# Patient Record
Sex: MALE | Employment: FULL TIME | URBAN - NONMETROPOLITAN AREA
[De-identification: names, ages, dates, MRNs, and addresses within clinical notes are randomized per-mention and may not be internally consistent; named-entity substitution may affect disease eponyms.]

---

## 2023-08-30 ENCOUNTER — OCCUPATIONAL MEDICINE (OUTPATIENT)
Dept: URGENT CARE | Facility: PHYSICIAN GROUP | Age: 38
End: 2023-08-30
Payer: OTHER MISCELLANEOUS

## 2023-08-30 VITALS
BODY MASS INDEX: 25.92 KG/M2 | DIASTOLIC BLOOD PRESSURE: 80 MMHG | SYSTOLIC BLOOD PRESSURE: 128 MMHG | WEIGHT: 175 LBS | OXYGEN SATURATION: 98 % | TEMPERATURE: 97.1 F | RESPIRATION RATE: 14 BRPM | HEART RATE: 67 BPM | HEIGHT: 69 IN

## 2023-08-30 DIAGNOSIS — M25.572 ACUTE LEFT ANKLE PAIN: ICD-10-CM

## 2023-08-30 DIAGNOSIS — Z02.6 ENCOUNTER RELATED TO WORKER'S COMPENSATION CLAIM: ICD-10-CM

## 2023-08-30 PROCEDURE — 3079F DIAST BP 80-89 MM HG: CPT | Performed by: NURSE PRACTITIONER

## 2023-08-30 PROCEDURE — 99203 OFFICE O/P NEW LOW 30 MIN: CPT | Performed by: NURSE PRACTITIONER

## 2023-08-30 PROCEDURE — 3074F SYST BP LT 130 MM HG: CPT | Performed by: NURSE PRACTITIONER

## 2023-08-30 RX ORDER — GABAPENTIN 400 MG/1
400 CAPSULE ORAL 3 TIMES DAILY
COMMUNITY

## 2023-08-30 RX ORDER — CLONAZEPAM 1 MG/1
TABLET ORAL 2 TIMES DAILY
COMMUNITY

## 2023-08-30 NOTE — PROGRESS NOTES
"Subjective:     Chuck Castro is a 38 y.o. male who presents for Ankle Injury (WC inj 8/29/2023 L ankle, pt was walking took a step down and twisted ankle, swelling, redness,able to put some weight on it )      DOI: 08/29/2023 ALYSSA: STEPPED WRONG OFF THE STEP AND TWISTED ANKLE  Visit #1-patient presents to clinic today acute left ankle pain after reporting that he stepped off of a door stoop and twisted his left ankle.  He states he was initially able to walk on it however throughout the day progressively got more swollen, tender, and painful with ambulation.  Patient woke up this morning and noticed bruising on lateral side of ankle.  He states today that he is able to bear weight however does ambulate with a limp.  He denies any numbness or tingling in his foot or ankle.  Patient did ambulate it and placed ice on it last night which did help.  He also took 400 mg gabapentin which also helped with pain.  He states that his pain level today is a 3-4/10, consistently a dull ache with intermittent sharp shooting pains that are worse with weightbearing or certain movements.  Denies any previous surgeries or injuries to his left ankle.  Denies second job.      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 Slightly antalgic gait.  Ankles: No noticeable deformity. Positive for swelling and bruising lateral ankle. ROM intact, does elicit pain with flexion and rotation. Tenderness to palpation on lateral ankle. No tenderness to palpation along posterior edge of lateral and medial malleoli, base of 5th metatarsal or navicular bone. " No tenderness along fibula. 5/5 strength bilaterally. Sensation intact. 2+ pedal pulses. Ankle reflex 2+.       Assessment/Plan:       1. Encounter related to worker's compensation claim    2. Acute left ankle pain  - DX-ANKLE 3+ VIEWS LEFT  - Walking Boot    Other orders  - gabapentin (NEURONTIN) 400 MG Cap; Take 400 mg by mouth 3 times a day.  - clonazePAM (KLONOPIN) 1 MG Tab; Take  by mouth 2 times a day.    Released to Restricted Duty FROM 8/30/2023 TO 9/6/2023  Work restrictions include no stooping, squatting, pushing, pulling, or climbing.  No walking or standing greater than 2 hours during shift.  Weight limit no more than 25 pounds.  Must wear walking boot while at work and when ambulating off duty.  May remove at night.  Recommended cryotherapy 20 minutes 2-3 times daily.  May take Tylenol 500 to 1000 mg up to 3 times daily and ibuprofen 600 mg up to 3 times daily with food.  Educated patient on gentle range of motion and stretching exercises.  X-ray order placed.       Differential diagnosis, natural history, supportive care, and indications for immediate follow-up discussed.    Approximately 35 minutes were spent in reviewing notes, preparing for visit, obtaining history, exam and evaluation, patient counseling/education and post visit documentation/orders.

## 2023-08-30 NOTE — LETTER
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    Medical Information   Related to Industrial Injury? Yes    Subjective Complaints:  DOI: 08/29/2023 ALYSSA: STEPPED WRONG OFF THE STEP AND TWISTED ANKLE  Visit #1-patient presents to clinic today acute left ankle pain after reporting that he stepped off of a door stoop and twisted his left ankle.  He states he was initially able to walk on it however throughout the day progressively got more swollen, tender, and painful with ambulation.  Patient woke up this morning and noticed bruising on lateral side of ankle.  He states today that he is able to bear weight however does ambulate with a limp.  He denies any numbness or tingling in his foot or ankle.  Patient did ambulate it and placed ice on it last night which did help.  He also took 400 mg gabapentin which also helped with pain.  He states that his pain level today is a 3-4/10, consistently a dull ache with intermittent sharp shooting pains that are worse with weightbearing or certain movements.  Denies any previous surgeries or injuries to his left ankle.  Denies second job.   Objective Findings: MSK: Patient is able to bear weight on Left foot. Slightly antalgic gait.  Ankles: No noticeable deformity. Positive for swelling and bruising lateral ankle. ROM intact, does elicit pain with flexion and rotation. Tenderness to palpation on lateral ankle. No tenderness to palpation  along posterior edge of lateral and medial malleoli, base of 5th metatarsal or navicular bone. No tenderness along fibula. 5/5 strength bilaterally. Sensation intact. 2+ pedal pulses. Ankle reflex 2+.      Pre-Existing Condition(s):     Assessment:   Initial Visit    Status: Additional Care Required  Permanent Disability:No    Plan: Medication    Diagnostics: X-ray    Comments:       Disability Information   Status: Released to Restricted Duty    From:  2023  Through: 2023 Restrictions are: Temporary   Physical Restrictions   Sitting:    Standing:  < or = to 2 hrs/day Stoopin hrs/day Bending:      Squattin hrs/day Walking:  < or = to 2 hrs/day Climbin hrs/day Pushin hrs/day   Pullin hrs/day Other:    Reaching Above Shoulder (L):   Reaching Above Shoulder (R):       Reaching Below Shoulder (L):    Reaching Below Shoulder (R):      Not to exceed Weight Limits   Carrying(hrs):   Weight Limit(lb): < or = to 25 pounds Lifting(hrs):   Weight  Limit(lb): < or = to 25 pounds   Comments: Work restrictions include no stooping, squatting, pushing, pulling, or climbing.  No walking or standing greater than 2 hours during shift.  Weight limit no more than 25 pounds.  Must wear walking boot while at work and when ambulating off duty.  May remove at night.  Recommended cryotherapy 20 minutes 2-3 times daily.  May take Tylenol 500 to 1000 mg up to 3 times daily and ibuprofen 600 mg up to 3 times daily with food.  Educated patient on gentle range of motion and stretching exercises.  X-ray order placed.    Repetitive Actions   Hands: i.e. Fine Manipulations from Grasping:     Feet: i.e. Operating Foot Controls:     Driving / Operate Machinery:     Health Care Provider’s Original or Electronic Signature  JAHAIRA Chung Health Care Provider’s Original or Electronic Signature    Zaid Lopez DO MPH     Clinic Name / Location: [address]  [address] Clinic Phone Number: Dept: [phone]   Appointment Time: 10:45 Am Visit Start Time: 10:59 AM   Check-In Time:  9:51 Am Visit Discharge Time:  11:49 AM    Original-Treating Physician or Chiropractor    Page 2-Insurer/TPA    Page 3-Employer    Page 4-Employee

## 2023-08-30 NOTE — LETTER
"EMPLOYEE’S CLAIM FOR COMPENSATION/ REPORT OF INITIAL TREATMENT  FORM C-4  PLEASE TYPE OR PRINT    EMPLOYEE’S CLAIM - PROVIDE ALL INFORMATION REQUESTED   First Name  Chuck Last Name  Castro Birthdate                    1985                Sex  male Claim Number (Insurer’s Use Only)   Home Address  [address] Age  38 y.o. 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 (Be specific and answer in detail. Use additional sheet if necessary)  STEPPED WRONG OFF THE STEP AND TWISTED ANKLE   If you believe that you have an occupational disease, when did you first have knowledge of the disability and its relationship to your employment?  NA Witnesses to the Accident (if applicable)  NA      Nature of Injury or Occupational Disease  Workers' Compensation  Part(s) of Body Injured or Affected  Ankle (L), ,     I CERTIFY THAT THE ABOVE IS TRUE AND CORRECT TO T HE BEST OF MY KNOWLEDGE AND THAT I HAVE PROVIDED THIS INFORMATION IN ORDER TO OBTAIN THE BENEFITS OF [place], OR [place]).  I HEREBY AUTHORIZE ANY PHYSICIAN, CHIROPRACTOR, SURGEON, PRACTITIONER OR ANY OTHER PERSON, ANY HOSPITAL, INCLUDING [facility] OR [facility], ANY  MEDICAL SERVICE ORGANIZATION, ANY INSURANCE COMPANY, OR OTHER INSTITUTION OR ORGANIZATION TO RELEASE TO EACH OTHER, ANY MEDICAL OR OTHER INFORMATION, INCLUDING BENEFITS PAID OR PAYABLE, PERTINENT TO THIS INJURY OR DISEASE, EXCEPT INFORMATION RELATIVE TO DIAGNOSIS, TREATMENT AND/OR COUNSELING FOR AIDS, PSYCHOLOGICAL CONDITIONS, ALCOHOL OR CONTROLLED SUBSTANCES, FOR WHICH I MUST GIVE SPECIFIC AUTHORIZATION.  A PHOTOSTAT OF THIS AUTHORIZATION SHALL BE VALID AS THE ORIGINAL.       Date  08/30/2023   [facility] Employee’s Original or  *Electronic Signature   THIS REPORT MUST BE COMPLETED AND MAILED WITHIN 3 WORKING DAYS OF TREATMENT   [facility]  Name of Facility  [place]   Date  8/30/2023 Diagnosis and Description of Injury or Occupational Disease  (Z02.6) Encounter related to worker's compensation claim  (M25.572) Acute left ankle pain Is there evidence that the injured employee was under the influence of alcohol and/or another controlled substance at the time of accident?  ? No ? Yes (if yes, please explain)   Hour  10:59 AM   Diagnoses of Encounter related to worker's compensation claim and Acute left ankle pain were pertinent to this visit. No   Treatment  Work restrictions include no stooping, squatting, pushing, pulling, or climbing.  No walking or standing greater than 2 hours during shift.  Weight limit no more than 25 pounds.  Must wear walking boot while at work and when ambulating off duty.  May remove at night.  Recommended cryotherapy 20 minutes 2-3 times daily.  May take Tylenol 500 to 1000 mg up to 3 times daily and ibuprofen 600 mg up to 3 times daily with food.  Educated patient on gentle range of motion and  stretching exercises.  X-ray order placed.  Have you advised the patient to remain off work five days or     more?    X-Ray Findings    Comments:Pending   ? Yes Indicate dates:   From   To      From information given by the employee, together with medical evidence, can        you directly connect this injury or occupational disease as job incurred?  Yes ? No If no, is the injured employee capable of:  ? full duty  No ? modified duty  Yes   Is additional medical care by a physician indicated?  Yes If modified duty, specify any limitations / restrictions  Work restrictions include no stooping, squatting, pushing, pulling, or climbing.  No walking or standing greater than 2 hours during shift.  Weight limit no more than 25 pounds.   Do you know of any previous injury or disease contributing to this condition or occupational disease?  ? Yes ? No (Explain if yes)                          No   Date  8/30/2023 Print Health Care Provider's  Name  JAHAIRA Chung I certify that the employer’s copy of  this form was delivered to the employer on:   Address  [address] [place] [address] Insurer’s Use Only     [facility]  [address]    Provider’s Tax ID Number  [id number] Telephone  Dept: [phone]             Health Care Provider’s Original or Electronic Signature  e-SignSOLGA HARDING Degree (MD,DO, DC,PA-C,APRN)  APRN      * Complete and attach Release of Information (Form C-4A) when injured employee signs C-4 Form electronically  ORIGINAL - TREATING HEALTHCARE PROVIDER PAGE 2 - INSURER/TPA PAGE 3 - EMPLOYER PAGE 4 - EMPLOYEE             Form C-4 (rev.08/21)           BRIEF DESCRIPTION OF RIGHTS AND BENEFITS  (Pursuant to [place] 616C.050)    Notice of Injury or Occupational Disease (Incident Report Form C-1): If an injury or occupational disease (OD) arises out of and in the course of employment, you must provide written notice to your employer as soon as practicable, but no later than 7 days  "after the accident or OD. Your employer shall maintain a sufficient supply of the required forms.    Claim for Compensation (Form C-4): If medical treatment is sought, the form C-4 is available at the place of initial treatment. A completed \"Claim for Compensation\" (Form C-4) must be filed within 90 days after an accident or OD. The treating physician or chiropractor must, within 3 working days after treatment, complete and mail to the employer, the employer's insurer and third-party , the Claim for Compensation.    Medical Treatment: If you require medical treatment for your on-the-job injury or OD, you may be required to select a physician or chiropractor from a list provided by your workers’ compensation insurer, if it has contracted with an Organization for Managed Care (MCO) or Preferred Provider Organization (PPO) or providers of health care. If your employer has not entered into a contract with an MCO or PPO, you may select a physician or chiropractor from the Panel of Physicians and Chiropractors. Any medical costs related to your industrial injury or OD will be paid by your insurer.    Temporary Total Disability (TTD): If your doctor has certified that you are unable to work for a period of at least 5 consecutive days, or 5 cumulative days in a 20-day period, or places restrictions on you that your employer does not accommodate, you may be entitled to TTD compensation.    Temporary Partial Disability (TPD): If the wage you receive upon reemployment is less than the compensation for TTD to which you are entitled, the insurer may be required to pay you TPD compensation to make up the difference. TPD can only be paid for a maximum of 24 months.    Permanent Partial Disability (PPD): When your medical condition is stable and there is an indication of a PPD as a result of your injury or OD, within 30 days, your insurer must arrange for an evaluation by a rating physician or chiropractor to determine " the degree of your PPD. The amount of your PPD award depends on the date of injury, the results of the PPD evaluation, your age and wage.    Permanent Total Disability (PTD): If you are medically certified by a treating physician or chiropractor as permanently and totally disabled and have been granted a PTD status by your insurer, you are entitled to receive monthly benefits not to exceed 66 2/3% of your average monthly wage. The amount of your PTD payments is subject to reduction if you previously received a lump-sum PPD award.    Vocational Rehabilitation Services: You may be eligible for vocational rehabilitation services if you are unable to return to the job due to a permanent physical impairment or permanent restrictions as a result of your injury or occupational disease.    Transportation and Per Juan Reimbursement: You may be eligible for travel expenses and per juan associated with medical treatment.    Reopening: You may be able to reopen your claim if your condition worsens after claim closure.     Appeal Process: If you disagree with a written determination issued by the insurer or the insurer does not respond to your request, you may appeal to the Department of Administration, , by following the instructions contained in your determination letter. You must appeal the determination within 70 days from the date of the determination letter at [address], [address], [place], [place] [address], or [address] [facility] [address], [place] [address]. If you disagree with the  decision, you may appeal to the Department of Administration, . You must file your appeal within 30 days from the date of the  decision letter at [address], [address], [place], [place] [address], or [address] [facility] [facility] [address], [place] [address]. If you disagree with a decision of an , you may file a petition for judicial  review with the District Court. You must do so within 30 days of the Appeal Officer’s decision. You may be represented by an  at your own expense or you may contact the [facility] for possible representation.    [place]  for Injured Workers (NAIW): If you disagree with a  decision, you may request that NAIW represent you without charge at an  Hearing. For information regarding denial of benefits, you may contact the [facility] at: [address], [address], [place] [address], [phone], or [address], [address] [address], [phone]    To File a Complaint with the Division: If you wish to file a complaint with the  of the Division of Industrial Relations (DIR),  please contact the Workers’ Compensation Section, [address], [address], [place], [place] [address], telephone [phone], or [address], [address], [place], [place] [address], telephone [phone].    For assistance with Workers’ Compensation Issues: You may contact the [facility] Office for Consumer Health Assistance, [address], [address], [place], [place] [address], Toll Free 1-714.743.4312, Web site: http://[facility].[place].gov/Programs/INDIO E-mail: indio@[facility].nv.gov              08/30/2023            __________________________________________________________________                                    _________________            Employee Name / Signature                                                                                                                            Date                                                                                                                                                                                                                              D-2 (rev. 10/20)

## 2023-09-06 ENCOUNTER — OCCUPATIONAL MEDICINE (OUTPATIENT)
Dept: URGENT CARE | Facility: PHYSICIAN GROUP | Age: 38
End: 2023-09-06
Payer: OTHER MISCELLANEOUS

## 2023-09-06 VITALS
DIASTOLIC BLOOD PRESSURE: 78 MMHG | HEART RATE: 69 BPM | RESPIRATION RATE: 16 BRPM | BODY MASS INDEX: 25.92 KG/M2 | OXYGEN SATURATION: 99 % | WEIGHT: 175 LBS | HEIGHT: 69 IN | SYSTOLIC BLOOD PRESSURE: 120 MMHG | TEMPERATURE: 97.5 F

## 2023-09-06 DIAGNOSIS — S93.402D SPRAIN OF LEFT ANKLE, UNSPECIFIED LIGAMENT, SUBSEQUENT ENCOUNTER: ICD-10-CM

## 2023-09-06 DIAGNOSIS — Z02.6 ENCOUNTER RELATED TO WORKER'S COMPENSATION CLAIM: ICD-10-CM

## 2023-09-06 PROCEDURE — 3074F SYST BP LT 130 MM HG: CPT | Performed by: NURSE PRACTITIONER

## 2023-09-06 PROCEDURE — 99213 OFFICE O/P EST LOW 20 MIN: CPT | Performed by: NURSE PRACTITIONER

## 2023-09-06 PROCEDURE — 3078F DIAST BP <80 MM HG: CPT | Performed by: NURSE PRACTITIONER

## 2023-09-06 NOTE — PROGRESS NOTES
Subjective:     Chuck Castro is a 38 y.o. male who presents for Work-Related Injury (Left foot- , but getting slightly better)      Copied from last visit  DOI: 08/29/2023 ALYSSA: STEPPED WRONG OFF THE STEP AND TWISTED ANKLE  Visit #1-patient presents to clinic today acute left ankle pain after reporting that he stepped off of a door stoop and twisted his left ankle.  He states he was initially able to walk on it however throughout the day progressively got more swollen, tender, and painful with ambulation.  Patient woke up this morning and noticed bruising on lateral side of ankle.  He states today that he is able to bear weight however does ambulate with a limp.  He denies any numbness or tingling in his foot or ankle.  Patient did ambulate it and placed ice on it last night which did help.  He also took 400 mg gabapentin which also helped with pain.  He states that his pain level today is a 3-4/10, consistently a dull ache with intermittent sharp shooting pains that are worse with weightbearing or certain movements.  Denies any previous surgeries or injuries to his left ankle.  Denies second job.    HPI 09/06/2023  FV #1-patient is here for follow-up visit due to left ankle sprain.  He states that he has seen improvement in both pain and swelling since his last visit.  He does continue to express pain in his ankle feeling tightness and band like.  He states pain is mildly worsened with walking, flexion and extension of his ankle, and lateral movements.  Mild pain in his heel and Achilles area. He does find comfort with his walking boot, Tylenol, ibuprofen, and cryotherapy.   Has been able to work with current restricted duty requirements.    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 Slightly antalgic gait.  Walking boot present.  Ankles: No noticeable deformity. Positive for mild swelling lateral ankle.  Negative for bruising.  ROM intact, does elicit pain with flexion and rotation. Tenderness to palpation on lateral ankle. No tenderness to palpation along posterior edge of lateral and medial malleoli, base of 5th metatarsal or navicular bone. No tenderness along fibula. 5/5 strength bilaterally. Sensation intact. 2+ pedal pulses.     Assessment/Plan:       1. Encounter related to worker's compensation claim    2. Sprain of left ankle, unspecified ligament, subsequent encounter  Patient continues have improvement to left ankle sprain.  Continue on current work restrictions and plan of care as listed below.    Released to Restricted Duty FROM 9/6/2023 TO 9/20/2023  Continue on current work restrictions which include no stooping, squatting, pushing, pulling, or climbing.  No walking or standing greater than 2 hours during shift.  Weight limit no more than 25 pounds.  Must wear walking boot while at work and when ambulating off duty.  May remove at night.  Recommended cryotherapy 20 minutes 2-3 times daily.  Elevate 2-3 times daily.  May take Tylenol 500 to 1000 mg up to 3 times daily and ibuprofen 600 mg up to 3 times daily with food.  Educated patient on gentle range of motion and stretching exercises.  Still waiting on obtaining ankle x-rays from [facility].  Call made to medical records by MA today.  Will call patient if any abnormalities are noted.       Differential diagnosis, natural history, supportive care, and indications for immediate follow-up discussed.    "

## 2023-09-06 NOTE — LETTER
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    Medical Information   Related to Industrial Injury? Yes    Subjective Complaints:  Copied from last visit  DOI: 08/29/2023 ALYSSA: STEPPED WRONG OFF THE STEP AND TWISTED ANKLE  Visit #1-patient presents to clinic today acute left ankle pain after reporting that he stepped off of a door stoop and twisted his left ankle.  He states he was initially able to walk on it however throughout the day progressively got more swollen, tender, and painful with ambulation.  Patient woke up this morning and noticed bruising on lateral side of ankle.  He states today that he is able to bear weight however does ambulate with a limp.  He denies any numbness or tingling in his foot or ankle.  Patient did ambulate it and placed ice on it last night which did help.  He also took 400 mg gabapentin which also helped with pain.  He states that his pain level today is a 3-4/10, consistently a dull ache with intermittent sharp shooting pains that are worse with weightbearing or certain movements.  Denies any previous surgeries or injuries to his left ankle.  Denies second job.    HPI 09/06/2023  FV #1-patient is here for follow-up visit due to left ankle sprain.  He states that he has seen improvement in both pain and swelling since his last visit.  He does continue to express pain in his ankle feeling tightness  and band like.  He states pain is mildly worsened with walking, flexion and extension of his ankle, and lateral movements.  Mild pain in his heel and Achilles area. He does find comfort with his walking boot, Tylenol, ibuprofen, and cryotherapy.   Has been able to work with current restricted duty requirements.   Objective Findings: MSK: Patient is able to bear weight on Left foot. Slightly antalgic gait.  Walking boot present.  Ankles: No noticeable deformity. Positive for mild swelling lateral ankle.  Negative for bruising.  ROM intact, does elicit pain with flexion and rotation. Tenderness to palpation on lateral ankle. No tenderness to palpation along posterior edge of lateral and medial malleoli, base of 5th metatarsal or navicular bone. No tenderness along fibula. 5/5 strength bilaterally. Sensation intact. 2+ pedal pulses.    Pre-Existing Condition(s):     Assessment:   Condition Improved    Status: Additional Care Required  Permanent Disability:     Plan: Medication    Diagnostics:      Comments:       Disability Information   Status: Released to Restricted Duty    From:  9/6/2023  Through: 9/20/2023 Restrictions are: Temporary   Physical Restrictions   Sitting:    Standing:    Stooping:    Bending:      Squatting:    Walking:    Climbing:    Pushing:      Pulling:    Other:    Reaching Above Shoulder (L):   Reaching Above Shoulder (R):       Reaching Below Shoulder (L):    Reaching Below Shoulder (R):      Not to exceed Weight Limits   Carrying(hrs):   Weight Limit(lb):   Lifting(hrs):   Weight  Limit(lb):     Comments: Continue on current work restrictions which include no stooping, squatting, pushing, pulling, or climbing.  No walking or standing greater than 2 hours during shift.  Weight limit no more than 25 pounds.  Must wear walking boot while at work and when ambulating off duty.  May remove at night.  Recommended cryotherapy 20 minutes 2-3 times daily.  Elevate 2-3 times daily.  May take Tylenol 500  to 1000 mg up to 3 times daily and ibuprofen 600 mg up to 3 times daily with food.  Educated patient on gentle range of motion and stretching exercises.  Still waiting on obtaining ankle x-rays from [facility].  Call made to medical records by MA today.  Will call patient if any abnormalities are noted.    Repetitive Actions   Hands: i.e. Fine Manipulations from Grasping:     Feet: i.e. Operating Foot Controls:     Driving / Operate Machinery:     Health Care Provider’s Original or Electronic Signature  HÉCTOR ChungRCassieN. Health Care Provider’s Original or Electronic Signature    Zaid Lopez DO MPH     Clinic Name / Location: [address] [address] Clinic Phone Number: Dept: [phone]   Appointment Time: 10:00 Am Visit Start Time: 9:52 AM   Check-In Time:  9:38 Am Visit Discharge Time:  10:45 AM    Original-Treating Physician or Chiropractor    Page 2-Insurer/TPA    Page 3-Employer    Page 4-Employee

## 2023-09-20 ENCOUNTER — OCCUPATIONAL MEDICINE (OUTPATIENT)
Dept: URGENT CARE | Facility: PHYSICIAN GROUP | Age: 38
End: 2023-09-20
Payer: OTHER MISCELLANEOUS

## 2023-09-20 VITALS
TEMPERATURE: 98.1 F | SYSTOLIC BLOOD PRESSURE: 132 MMHG | RESPIRATION RATE: 16 BRPM | DIASTOLIC BLOOD PRESSURE: 82 MMHG | BODY MASS INDEX: 25.92 KG/M2 | HEIGHT: 69 IN | OXYGEN SATURATION: 98 % | HEART RATE: 78 BPM | WEIGHT: 175 LBS

## 2023-09-20 DIAGNOSIS — S93.402D SPRAIN OF LEFT ANKLE, UNSPECIFIED LIGAMENT, SUBSEQUENT ENCOUNTER: ICD-10-CM

## 2023-09-20 PROCEDURE — 3079F DIAST BP 80-89 MM HG: CPT | Performed by: PHYSICIAN ASSISTANT

## 2023-09-20 PROCEDURE — 99213 OFFICE O/P EST LOW 20 MIN: CPT | Performed by: PHYSICIAN ASSISTANT

## 2023-09-20 PROCEDURE — 3075F SYST BP GE 130 - 139MM HG: CPT | Performed by: PHYSICIAN ASSISTANT

## 2023-09-20 NOTE — LETTER
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    Medical Information   Related to Industrial Injury?   yes   Subjective Complaints:    The patient presents to clinic for Workmen's Comp. follow-up.    DOI: 08/29/2023  ALYSSA: STEPPED WRONG OFF THE STEP AND TWISTED ANKLE    Previous Visits:  Visit #1-patient presents to clinic today acute left ankle pain after reporting that he stepped off of a door stoop and twisted his left ankle.  He states he was initially able to walk on it however throughout the day progressively got more swollen, tender, and painful with ambulation.  Patient woke up this morning and noticed bruising on lateral side of ankle.  He states today that he is able to bear weight however does ambulate with a limp.  He denies any numbness or tingling in his foot or ankle.  Patient did ambulate it and placed ice on it last night which did help.  He also took 400 mg gabapentin which also helped with pain.  He states that his pain level today is a 3-4/10, consistently a dull ache with intermittent sharp shooting pains that are worse with weightbearing or certain movements.  Denies any previous surgeries or injuries to his left ankle.  Denies second job.    FV #1-patient is here for follow-up visit due to left ankle sprain.  He states that he has seen improvement in both pain and swelling since his last visit.  He does continue to express pain in his ankle feeling tightness and band like.  He  states pain is mildly worsened with walking, flexion and extension of his ankle, and lateral movements.  Mild pain in his heel and Achilles area. He does find comfort with his walking boot, Tylenol, ibuprofen, and cryotherapy.   Has been able to work with current restricted duty requirements.    Today:  FV #2:  Today, the patient reports improvement of his left ankle sprain.  The patient states he is approximately 65% improved at today's visit.  The patient reports continued pain and soreness to the lateral aspect of his left ankle.  The patient states he is also experiencing some stiffness of the Achilles tendon.  The patient notes intermittent swelling of the left ankle.  He reports no associated bruising or redness.  The patient states he has been wearing his walking boot as directed, but states he is able to ambulate at home without the walking boot.  The patient notes some increased pain with ambulation without wearing the boot.  The patient has not required any OTC medications for his current symptoms.  The patient is tolerating light duty without difficulty.   Objective Findings:   Left Ankle:  Tenderness (minimal) to the lateral aspect of the left ankle inferior to the lateral malleolus.  No additional tenderness of the left ankle.  No edema.  No ecchymosis.  No overlying erythema.  No increased warmth.  ROM intact -the patient demonstrates full active range of motion of the left ankle/foot  Neurovascular intact distally  Strength 5/5 -dorsiflexion/plantarflexion of the left ankle/foot against resistance  Normal gait -with walking boot      Pre-Existing Condition(s):     Assessment:   Condition Improved    Status: Additional Care Required  Permanent Disability:     Plan:      Diagnostics:      Comments:       Disability Information   Status: Released to Restricted Duty    From:  9/20/2023  Through: 10/2/2023 Restrictions are: Temporary   Physical Restrictions   Sitting:    Standing:    Stooping:     Bending:      Squatting:    Walking:    Climbing:    Pushing:      Pulling:    Other:    Reaching Above Shoulder (L):   Reaching Above Shoulder (R):       Reaching Below Shoulder (L):    Reaching Below Shoulder (R):      Not to exceed Weight Limits   Carrying(hrs):   Weight Limit(lb):   Lifting(hrs):   Weight  Limit(lb):     Comments:   ** Continue on current work restrictions which include no stooping, squatting, pushing, pulling, or climbing.  No walking or standing greater than 2 hours during shift.  Weight limit no more than 25 pounds.  Must wear walking boot while at work and when ambulating off duty.  May remove at night.    Plan:  Continue Light Duty Work Restrictions - D39 provided   OTC NSAIDs for pain/discomfort   RICE  Wear walking boot for additional support  Weight-bearing as tolerated  Follow-up in 10 days for re-assessment   Return to clinic or go tot he ED if symptoms worsen or fail to improve, or if the patient should develop worsening/increasing pain/tenderness, swelling, bruising, redness or warmth to the affected area, decreased ROM, numbness, tingling or weakness, difficulty walking, fever/chills, and/or any concerning symptoms.     Repetitive Actions   Hands: i.e. Fine Manipulations from Grasping:     Feet: i.e. Operating Foot Controls:     Driving / Operate Machinery:     Health Care Provider’s Original or Electronic Signature  Yolis Clayton P.A.-C. Health Care Provider’s Original or Electronic Signature    Zaid Lopez DO MPH     Clinic Name / Location: [address] [address] Clinic Phone Number: Dept: [phone]   Appointment Time: 10:00 Am Visit Start Time: 10:02 AM   Check-In Time:  9:38 Am Visit Discharge Time:  10:40 am   Original-Treating Physician or Chiropractor    Page 2-Insurer/TPA    Page 3-Employer    Page 4-Employee

## 2023-09-20 NOTE — PROGRESS NOTES
Emil Castro is a 38 y.o. male who presents with Follow-Up (WC follow up on L ankle, is wearing boot at work,  and and sore, some swelling, still feels tight, flexing foot up and inward painful and feels tight, )            HPI    The patient presents to clinic for Workmen's Comp. follow-up.    DOI: 08/29/2023  ALYSSA: STEPPED WRONG OFF THE STEP AND TWISTED ANKLE    Previous Visits:  Visit #1-patient presents to clinic today acute left ankle pain after reporting that he stepped off of a door stoop and twisted his left ankle.  He states he was initially able to walk on it however throughout the day progressively got more swollen, tender, and painful with ambulation.  Patient woke up this morning and noticed bruising on lateral side of ankle.  He states today that he is able to bear weight however does ambulate with a limp.  He denies any numbness or tingling in his foot or ankle.  Patient did ambulate it and placed ice on it last night which did help.  He also took 400 mg gabapentin which also helped with pain.  He states that his pain level today is a 3-4/10, consistently a dull ache with intermittent sharp shooting pains that are worse with weightbearing or certain movements.  Denies any previous surgeries or injuries to his left ankle.  Denies second job.    FV #1-patient is here for follow-up visit due to left ankle sprain.  He states that he has seen improvement in both pain and swelling since his last visit.  He does continue to express pain in his ankle feeling tightness and band like.  He states pain is mildly worsened with walking, flexion and extension of his ankle, and lateral movements.  Mild pain in his heel and Achilles area. He does find comfort with his walking boot, Tylenol, ibuprofen, and cryotherapy.   Has been able to work with current restricted duty requirements.    Today:  FV #2:  Today, the patient reports improvement of his left ankle sprain.  The patient states he is  "approximately 65% improved at today's visit.  The patient reports continued pain and soreness to the lateral aspect of his left ankle.  The patient states he is also experiencing some stiffness of the Achilles tendon.  The patient notes intermittent swelling of the left ankle.  He reports no associated bruising or redness.  The patient states he has been wearing his walking boot as directed, but states he is able to ambulate at home without the walking boot.  The patient notes some increased pain with ambulation without wearing the boot.  The patient has not required any OTC medications for his current symptoms.  The patient is tolerating light duty without difficulty.    PMH: Reviewed in Epic, no pertinent findings.  MEDS: Reviewed in Epic.  ALLERGIES: Reviewed in Epic.  SURGHX: Reviewed in Epic.  SOCHX: Reviewed in Epic.  FH: Family history was reviewed, no pertinent findings to report.      Review of Systems   Musculoskeletal:  Positive for joint pain.   All other systems reviewed and are negative.             Objective     /82   Pulse 78   Temp 36.7 °C (98.1 °F) (Temporal)   Resp 16   Ht 1.753 m (5' 9\")   Wt 79.4 kg (175 lb)   SpO2 98%   BMI 25.84 kg/m²      Physical Exam  Constitutional:       General: He is not in acute distress.     Appearance: Normal appearance. He is well-developed. He is not ill-appearing.   HENT:      Head: Normocephalic and atraumatic.      Right Ear: External ear normal.      Left Ear: External ear normal.   Eyes:      Extraocular Movements: Extraocular movements intact.      Conjunctiva/sclera: Conjunctivae normal.   Cardiovascular:      Rate and Rhythm: Normal rate.   Pulmonary:      Effort: Pulmonary effort is normal.   Musculoskeletal:      Cervical back: Normal range of motion and neck supple.      Comments:   Left Ankle:  Tenderness (minimal) to the lateral aspect of the left ankle inferior to the lateral malleolus.  No additional tenderness of the left ankle.  No " edema.  No ecchymosis.  No overlying erythema.  No increased warmth.  ROM intact -the patient demonstrates full active range of motion of the left ankle/foot  Neurovascular intact distally  Strength 5/5 -dorsiflexion/plantarflexion of the left ankle/foot against resistance  Normal gait -with walking boot   Skin:     General: Skin is warm and dry.   Neurological:      Mental Status: He is alert and oriented to person, place, and time.                             Assessment & Plan          1. Sprain of left ankle, unspecified ligament, subsequent encounter    Differential diagnoses, supportive care, and indications for immediate follow-up discussed with patient.   Instructed to return to clinic or nearest emergency department for any change in condition, further concerns, or worsening of symptoms.    Plan:  Continue Light Duty Work Restrictions - D39 provided   OTC NSAIDs for pain/discomfort   RICE  Wear walking boot for additional support  Weight-bearing as tolerated  Follow-up in 10 days for re-assessment   Return to clinic or go tot he ED if symptoms worsen or fail to improve, or if the patient should develop worsening/increasing pain/tenderness, swelling, bruising, redness or warmth to the affected area, decreased ROM, numbness, tingling or weakness, difficulty walking, fever/chills, and/or any concerning symptoms.     Discussed plan with the patient, and he agrees to the above.    I personally reviewed prior external notes and test results pertinent to today's visit.  I have independently reviewed and interpreted all diagnostics ordered during this urgent care visit.     Please note that this dictation was created using voice recognition software. I have made every reasonable attempt to correct obvious errors, but I expect that there may be errors of grammar and possibly content that I did not discover before finalizing the note.     This note was electronically signed by Yolis Clayton PA-C

## 2023-10-02 ENCOUNTER — OCCUPATIONAL MEDICINE (OUTPATIENT)
Dept: URGENT CARE | Facility: PHYSICIAN GROUP | Age: 38
End: 2023-10-02
Payer: OTHER MISCELLANEOUS

## 2023-10-02 VITALS
HEIGHT: 69 IN | RESPIRATION RATE: 14 BRPM | DIASTOLIC BLOOD PRESSURE: 62 MMHG | OXYGEN SATURATION: 98 % | WEIGHT: 175 LBS | HEART RATE: 65 BPM | BODY MASS INDEX: 25.92 KG/M2 | TEMPERATURE: 98.3 F | SYSTOLIC BLOOD PRESSURE: 118 MMHG

## 2023-10-02 DIAGNOSIS — Z02.6 ENCOUNTER RELATED TO WORKER'S COMPENSATION CLAIM: ICD-10-CM

## 2023-10-02 DIAGNOSIS — S93.402D SPRAIN OF LEFT ANKLE, UNSPECIFIED LIGAMENT, SUBSEQUENT ENCOUNTER: ICD-10-CM

## 2023-10-02 PROCEDURE — 3074F SYST BP LT 130 MM HG: CPT | Performed by: NURSE PRACTITIONER

## 2023-10-02 PROCEDURE — 3078F DIAST BP <80 MM HG: CPT | Performed by: NURSE PRACTITIONER

## 2023-10-02 PROCEDURE — 99213 OFFICE O/P EST LOW 20 MIN: CPT | Performed by: NURSE PRACTITIONER

## 2023-10-02 NOTE — LETTER
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    Medical Information   Related to Industrial Injury?      Subjective Complaints:  DOI: 08/29/2023  ALYSSA: STEPPED WRONG OFF THE STEP AND TWISTED ANKLE     Previous Visits:  Visit #1-patient presents to clinic today acute left ankle pain after reporting that he stepped off of a door stoop and twisted his left ankle.  He states he was initially able to walk on it however throughout the day progressively got more swollen, tender, and painful with ambulation. Patient woke up this morning and noticed bruising on lateral side of ankle.  He states today that he is able to bear weight however does ambulate with a limp.  He denies any numbness or tingling in his foot or ankle.  Patient did ambulate it and placed ice on it last night which did help.  He also took 400 mg gabapentin which also helped with pain. He states that his pain level today is a 3-4/10, consistently a dull ache with intermittent sharp shooting pains that are worse with weightbearing or certain movements.  Denies any previous surgeries or injuries to his left ankle. Denies second job.     FV #1-patient is here for follow-up visit due to left ankle sprain.  He states that he has seen improvement in both pain and swelling since his last visit.  He does continue to express pain in his ankle feeling tightness and band like.  He states  pain is mildly worsened with walking, flexion and extension of his ankle, and lateral movements.  Mild pain in his heel and Achilles area. He does find comfort with his walking boot, Tylenol, ibuprofen, and cryotherapy.  Has been able to work with current restricted duty requirements.     FV #2: The patient reports improvement of his left ankle sprain.  The patient states he is approximately 65% improved at today's visit.  The patient reports continued pain and soreness to the lateral aspect of his left ankle.  The patient states he is also experiencing some stiffness of the Achilles tendon.  The patient notes intermittent swelling of the left ankle.  He reports no associated bruising or redness.  The patient states he has been wearing his walking boot as directed, but states he is able to ambulate at home without the walking boot.  The patient notes some increased pain with ambulation without wearing the boot.  The patient has not required any OTC medications for his current symptoms.  The patient is tolerating light duty without difficulty.    FV #3-patient returns for follow-up office visit, states he has noticed continued improvement of left ankle swelling and pain, noted about 85-90% improvement.  Continues to have some stiffness with plantarflexion and dorsiflexion, has been wearing walking boot without difficulty while at work, has been able to ambulate without at home.  Has not needed OTC medications at this time.     PMH: Reviewed in Epic, no pertinent findings.  MEDS: Reviewed in Epic.  ALLERGIES: Reviewed in Epic.  SURGHX: Reviewed in Epic.  SOCHX: Reviewed in Epic.  FH: Family history was reviewed, no pertinent findings to report.   Objective Findings: Left ankle: Continued left lateral ankle edema, mild tenderness anterolateral ankle, slight decrease in plantarflexion and dorsiflexion related to feeling of tightness.  Gait is stable with use of walking boot.   Pre-Existing Condition(s):     Assessment:    Condition Improved    Status: Additional Care Required  Permanent Disability:     Plan:      Diagnostics:      Comments:       Disability Information   Status: Released to Restricted Duty    From:  10/2/2023  Through: 10/9/2023 Restrictions are: Temporary   Physical Restrictions   Sitting:    Standing:    Stooping:    Bending:      Squatting:    Walking:    Climbing:    Pushing:      Pulling:    Other:    Reaching Above Shoulder (L):   Reaching Above Shoulder (R):       Reaching Below Shoulder (L):    Reaching Below Shoulder (R):      Not to exceed Weight Limits   Carrying(hrs):   Weight Limit(lb): < or = to 25 pounds Lifting(hrs):   Weight  Limit(lb): < or = to 25 pounds   Comments: Continue on current work restrictions which include no stooping, squatting, pushing, pulling, or climbing.  No walking or standing greater than 2 hours during shift.  Weight limit no more than 25 pounds.  Must wear walking boot while at work and when ambulating off duty.  May remove at night.  Anticipate full MMI at next visit.      Repetitive Actions   Hands: i.e. Fine Manipulations from Grasping:     Feet: i.e. Operating Foot Controls:     Driving / Operate Machinery:     Health Care Provider’s Original or Electronic Signature  HÉCTOR WoodardRCassieN. Health Care Provider’s Original or Electronic Signature    Zaid Lopez DO MPH     Clinic Name / Location: [address] [place] [address] Clinic Phone Number: Dept: [phone]   Appointment Time: 2:00 Pm Visit Start Time: 2:20 PM   Check-In Time:  1:43 Pm Visit Discharge Time:  3:01pm   Original-Treating Physician or Chiropractor    Page 2-Insurer/TPA    Page 3-Employer    Page 4-Employee

## 2023-10-09 ENCOUNTER — OCCUPATIONAL MEDICINE (OUTPATIENT)
Dept: URGENT CARE | Facility: PHYSICIAN GROUP | Age: 38
End: 2023-10-09
Payer: OTHER MISCELLANEOUS

## 2023-10-09 VITALS
BODY MASS INDEX: 25.92 KG/M2 | TEMPERATURE: 98.6 F | HEIGHT: 69 IN | WEIGHT: 175 LBS | SYSTOLIC BLOOD PRESSURE: 130 MMHG | DIASTOLIC BLOOD PRESSURE: 80 MMHG | OXYGEN SATURATION: 96 % | HEART RATE: 96 BPM | RESPIRATION RATE: 16 BRPM

## 2023-10-09 DIAGNOSIS — Z02.6 ENCOUNTER RELATED TO WORKER'S COMPENSATION CLAIM: ICD-10-CM

## 2023-10-09 DIAGNOSIS — S93.402D SPRAIN OF LEFT ANKLE, UNSPECIFIED LIGAMENT, SUBSEQUENT ENCOUNTER: ICD-10-CM

## 2023-10-09 PROCEDURE — 3079F DIAST BP 80-89 MM HG: CPT | Performed by: NURSE PRACTITIONER

## 2023-10-09 PROCEDURE — 3075F SYST BP GE 130 - 139MM HG: CPT | Performed by: NURSE PRACTITIONER

## 2023-10-09 PROCEDURE — 99212 OFFICE O/P EST SF 10 MIN: CPT | Performed by: NURSE PRACTITIONER

## 2023-10-09 NOTE — PROGRESS NOTES
"Subjective:     Chuck Castro is a 38 y.o. male who presents for Work-Related Injury ( fv- foot)      HPI 10/9/2023  Visit #5-patient states that his pain has completely resolved in his left ankle.  He has full range of motion.  He is able to ambulate normally without walking boot.  He denies any numbness or tingling in his foot denies any pain or swelling to his ankle.  He feels that he is able to go back to work to full duty without restrictions.  He has not needed to take any over-the-counter analgesics.    PMH:   No pertinent past medical history to this problem  MEDS:  Medications were reviewed in EMR  ALLERGIES:  Allergies were reviewed in EMR  SOCHX:  Works as a   FH:   No pertinent family history to this problem       Objective:     /80   Pulse 96   Temp 37 °C (98.6 °F) (Temporal)   Resp 16   Ht 1.753 m (5' 9\")   Wt 79.4 kg (175 lb)   SpO2 96%   BMI 25.84 kg/m²     MSK: Patient is able to bear weight on left foot or ankle.  Normal gait  Ankles: No noticeable deformity, swelling or bruising. ROM intact.  No tenderness to palpation on left ankle. No tenderness to palpation along posterior edge of lateral and medial malleoli, base of 5th metatarsal or navicular bone. 5/5 strength bilaterally. Sensation intact. 2+ pedal pulses. Ankle reflex 2+.       Assessment/Plan:       1. Encounter related to worker's compensation claim    2. Sprain of left ankle, unspecified ligament, subsequent encounter    Released to Full Duty FROM   TO            Differential diagnosis, natural history, supportive care, and indications for immediate follow-up discussed.    "

## 2023-10-09 NOTE — LETTER
[facility]  [place] [address]  Phone:  [phone] - Fax:  [phone]   [facility] Progress Report and Disability Certification  Date of Service: 10/9/2023   No Show:  No  Date / Time of Next Visit:     Claim Information   Patient Name: Chuck Castro  Claim Number:     Employer:    Date of Injury:      Insurer / TPA: [facility] [employer]  ID / SSN:     Occupation:   Diagnosis: Diagnoses of Encounter related to worker's compensation claim and Sprain of left ankle, unspecified ligament, subsequent encounter were pertinent to this visit.    Medical Information   Related to Industrial Injury?      Subjective Complaints:  HPI 10/9/2023  Visit #5-patient states that his pain has completely resolved in his left ankle.  He has full range of motion.  He is able to ambulate normally without walking boot.  He denies any numbness or tingling in his foot denies any pain or swelling to his ankle.  He feels that he is able to go back to work to full duty without restrictions.  He has not needed to take any over-the-counter analgesics.   Objective Findings: MSK: Patient is able to bear weight on left foot or ankle.  Normal gait  Ankles: No noticeable deformity, swelling or bruising. ROM intact.  No tenderness to palpation on left ankle. No tenderness to palpation along posterior edge of lateral and medial malleoli, base of 5th metatarsal or navicular bone. 5/5 strength bilaterally. Sensation intact. 2+ pedal pulses. Ankle reflex 2+.      Pre-Existing Condition(s):     Assessment:   Condition Improved    Status: Discharged /  MMI  Permanent Disability:     Plan:      Diagnostics:      Comments:       Disability Information   Status: Released to Full Duty    From:     Through:   Restrictions are:     Physical Restrictions   Sitting:    Standing:    Stooping:    Bending:      Squatting:    Walking:    Climbing:    Pushing:      Pulling:    Other:    Reaching Above  Shoulder (L):   Reaching Above Shoulder (R):       Reaching Below Shoulder (L):    Reaching Below Shoulder (R):      Not to exceed Weight Limits   Carrying(hrs):   Weight Limit(lb):   Lifting(hrs):   Weight  Limit(lb):     Comments:      Repetitive Actions   Hands: i.e. Fine Manipulations from Grasping:     Feet: i.e. Operating Foot Controls:     Driving / Operate Machinery:     Health Care Provider’s Original or Electronic Signature  Tamara Eagle A.P.R.NCassie Health Care Provider’s Original or Electronic Signature    Zaid Lopez DO MPH     Clinic Name / Location: [address] [address] Clinic Phone Number: Dept: [phone]   Appointment Time: 2:30 Pm Visit Start Time: 2:56 PM   Check-In Time:  2:14 Pm Visit Discharge Time:  3:29 PM   Original-Treating Physician or Chiropractor    Page 2-Insurer/TPA    Page 3-Employer    Page 4-Employee

## 2023-10-28 NOTE — PROGRESS NOTES
Subjective:   Chuck Castro is a 38 y.o. male who presents for Follow-Up (WC follow up, L Ankle, is able to put more weight on it, no more swelling, full range of motion, still some soreness, )      HPI  DOI: 08/29/2023  ALYSSA: STEPPED WRONG OFF THE STEP AND TWISTED ANKLE     Previous Visits:  Visit #1-patient presents to clinic today acute left ankle pain after reporting that he stepped off of a door stoop and twisted his left ankle.  He states he was initially able to walk on it however throughout the day progressively got more swollen, tender, and painful with ambulation. Patient woke up this morning and noticed bruising on lateral side of ankle.  He states today that he is able to bear weight however does ambulate with a limp.  He denies any numbness or tingling in his foot or ankle.  Patient did ambulate it and placed ice on it last night which did help.  He also took 400 mg gabapentin which also helped with pain. He states that his pain level today is a 3-4/10, consistently a dull ache with intermittent sharp shooting pains that are worse with weightbearing or certain movements.  Denies any previous surgeries or injuries to his left ankle. Denies second job.     FV #1-patient is here for follow-up visit due to left ankle sprain.  He states that he has seen improvement in both pain and swelling since his last visit.  He does continue to express pain in his ankle feeling tightness and band like.  He states pain is mildly worsened with walking, flexion and extension of his ankle, and lateral movements.  Mild pain in his heel and Achilles area. He does find comfort with his walking boot, Tylenol, ibuprofen, and cryotherapy.  Has been able to work with current restricted duty requirements.     FV #2: The patient reports improvement of his left ankle sprain.  The patient states he is approximately 65% improved at today's visit.  The patient reports continued pain and soreness to the lateral aspect of his left ankle.  The  "patient states he is also experiencing some stiffness of the Achilles tendon.  The patient notes intermittent swelling of the left ankle.  He reports no associated bruising or redness.  The patient states he has been wearing his walking boot as directed, but states he is able to ambulate at home without the walking boot.  The patient notes some increased pain with ambulation without wearing the boot.  The patient has not required any OTC medications for his current symptoms.  The patient is tolerating light duty without difficulty.    FV #3-patient returns for follow-up office visit, states he has noticed continued improvement of left ankle swelling and pain, noted about 85-90% improvement.  Continues to have some stiffness with plantarflexion and dorsiflexion, has been wearing walking boot without difficulty while at work, has been able to ambulate without at home.  Has not needed OTC medications at this time.     PMH: Reviewed in Epic, no pertinent findings.  MEDS: Reviewed in Epic.  ALLERGIES: Reviewed in Epic.  SURGHX: Reviewed in Epic.  SOCHX: Reviewed in Epic.  FH: Family history was reviewed, no pertinent findings to report.    ROS  All other systems are negative except as documented above within HPI.    MEDS:   Current Outpatient Medications:     gabapentin (NEURONTIN) 400 MG Cap, Take 400 mg by mouth 3 times a day., Disp: , Rfl:     clonazePAM (KLONOPIN) 1 MG Tab, Take  by mouth 2 times a day., Disp: , Rfl:   ALLERGIES:   Allergies   Allergen Reactions    Sulfa Drugs Unspecified     Chemical sunburn        Patient's PMH, SocHx, SurgHx, FamHx, Drug allergies and medications were reviewed.     Objective:   /62   Pulse 65   Temp 36.8 °C (98.3 °F) (Temporal)   Resp 14   Ht 1.753 m (5' 9\")   Wt 79.4 kg (175 lb)   SpO2 98%   BMI 25.84 kg/m²     Physical Exam  Vitals and nursing note reviewed.   Constitutional:       General: He is awake.      Appearance: Normal appearance. He is well-developed. "   HENT:      Head: Normocephalic and atraumatic.      Right Ear: External ear normal.      Left Ear: External ear normal.      Nose: Nose normal.      Mouth/Throat:      Lips: Pink.      Mouth: Mucous membranes are moist.      Pharynx: Oropharynx is clear.   Eyes:      General: Lids are normal.      Extraocular Movements: Extraocular movements intact.      Conjunctiva/sclera: Conjunctivae normal.   Cardiovascular:      Rate and Rhythm: Normal rate and regular rhythm.   Pulmonary:      Effort: Pulmonary effort is normal.   Musculoskeletal:         General: Normal range of motion.      Cervical back: Normal range of motion.      Comments: Left ankle: Continued left lateral ankle edema, mild tenderness anterolateral ankle, slight decrease in plantarflexion and dorsiflexion related to feeling of tightness.  Gait is stable with use of walking boot.   Skin:     General: Skin is warm and dry.   Neurological:      Mental Status: He is alert and oriented to person, place, and time.   Psychiatric:         Mood and Affect: Mood normal.         Behavior: Behavior normal. Behavior is cooperative.         Thought Content: Thought content normal.         Judgment: Judgment normal.         Assessment/Plan:   Assessment    1. Sprain of left ankle, unspecified ligament, subsequent encounter    2. Encounter related to worker's compensation claim    See D39.  Continue on current work restrictions which include no stooping, squatting, pushing, pulling, or climbing.  No walking or standing greater than 2 hours during shift.  Weight limit no more than 25 pounds.  Must wear walking boot while at work and when ambulating off duty.  May remove at night.  Anticipate full MMI at next visit.    Please note that this dictation was created using voice recognition software. I have made a reasonable attempt to correct obvious errors, but I expect that there are errors of grammar and possibly content that I did not discover before finalizing the  note.